# Patient Record
Sex: FEMALE | Race: WHITE | HISPANIC OR LATINO | Employment: FULL TIME | URBAN - METROPOLITAN AREA
[De-identification: names, ages, dates, MRNs, and addresses within clinical notes are randomized per-mention and may not be internally consistent; named-entity substitution may affect disease eponyms.]

---

## 2023-11-06 ENCOUNTER — OFFICE VISIT (OUTPATIENT)
Dept: FAMILY MEDICINE CLINIC | Facility: CLINIC | Age: 28
End: 2023-11-06
Payer: COMMERCIAL

## 2023-11-06 VITALS
TEMPERATURE: 98.1 F | DIASTOLIC BLOOD PRESSURE: 82 MMHG | HEART RATE: 78 BPM | HEIGHT: 66 IN | SYSTOLIC BLOOD PRESSURE: 110 MMHG | BODY MASS INDEX: 25.39 KG/M2 | RESPIRATION RATE: 16 BRPM | WEIGHT: 158 LBS

## 2023-11-06 DIAGNOSIS — Z13.29 SCREENING FOR THYROID DISORDER: ICD-10-CM

## 2023-11-06 DIAGNOSIS — Z86.2 HISTORY OF ANEMIA: ICD-10-CM

## 2023-11-06 DIAGNOSIS — J45.20 MILD INTERMITTENT ASTHMA WITHOUT COMPLICATION: ICD-10-CM

## 2023-11-06 DIAGNOSIS — Z11.59 NEED FOR HEPATITIS C SCREENING TEST: ICD-10-CM

## 2023-11-06 DIAGNOSIS — Z13.6 SCREENING FOR CARDIOVASCULAR, RESPIRATORY, AND GENITOURINARY DISEASES: ICD-10-CM

## 2023-11-06 DIAGNOSIS — Z11.4 SCREENING FOR HIV (HUMAN IMMUNODEFICIENCY VIRUS): ICD-10-CM

## 2023-11-06 DIAGNOSIS — N39.0 URINARY TRACT INFECTION WITHOUT HEMATURIA, SITE UNSPECIFIED: ICD-10-CM

## 2023-11-06 DIAGNOSIS — Z11.3 SCREENING FOR STD (SEXUALLY TRANSMITTED DISEASE): ICD-10-CM

## 2023-11-06 DIAGNOSIS — Z13.89 SCREENING FOR CARDIOVASCULAR, RESPIRATORY, AND GENITOURINARY DISEASES: ICD-10-CM

## 2023-11-06 DIAGNOSIS — Z13.83 SCREENING FOR CARDIOVASCULAR, RESPIRATORY, AND GENITOURINARY DISEASES: ICD-10-CM

## 2023-11-06 DIAGNOSIS — Z00.00 WELL ADULT EXAM: Primary | ICD-10-CM

## 2023-11-06 DIAGNOSIS — Z12.4 SCREENING FOR CERVICAL CANCER: ICD-10-CM

## 2023-11-06 PROCEDURE — 99385 PREV VISIT NEW AGE 18-39: CPT | Performed by: FAMILY MEDICINE

## 2023-11-06 RX ORDER — LEVALBUTEROL TARTRATE 45 UG/1
1-2 AEROSOL, METERED ORAL EVERY 4 HOURS PRN
Qty: 15 G | Refills: 0 | Status: SHIPPED | OUTPATIENT
Start: 2023-11-06

## 2023-11-06 NOTE — ASSESSMENT & PLAN NOTE
History of asthma since childhood, initially improved for a while, but returned after having COVID. Gets short of breath rarely, usually related to weather change. Has tried albuterol in the past, but has tachycardia with it so uses levalbuterol inhaler prn.

## 2023-11-06 NOTE — PROGRESS NOTES
[address]    NAME: Cody Riding  AGE: 33 y.o. SEX: female  : 1995     DATE: 2023     Assessment and Plan:     Problem List Items Addressed This Visit          Respiratory    Mild intermittent asthma     History of asthma since childhood, initially improved for a while, but returned after having COVID. Gets short of breath rarely, usually related to weather change. Has tried albuterol in the past, but has tachycardia with it so uses levalbuterol inhaler prn. Relevant Medications    levalbuterol (Xopenex HFA) 45 mcg/act inhaler     Other Visit Diagnoses       Well adult exam    -  Primary    Need for hepatitis C screening test        Relevant Orders    Hepatitis C Antibody    Screening for HIV (human immunodeficiency virus)        Relevant Orders    HIV 1/2 Antigen/Antibody (Fourth Generation) with Reflex Testing ([facility], [facility], or EXTERNAL LAB)    Screening for cardiovascular, respiratory, and genitourinary diseases        Relevant Orders    CBC and differential    Comprehensive metabolic panel    Lipid Panel with Direct LDL reflex    Screening for thyroid disorder        Relevant Orders    TSH, 3rd generation with Free T4 reflex    Screening for cervical cancer        Relevant Orders    Ambulatory referral to Obstetrics / Gynecology    Screening for STD (sexually transmitted disease)        Relevant Orders    RPR w/reflex to TrepSure    Chlamydia/GC amplified DNA by PCR    Herpes I/II IgG TAMICA w Reflex to HSV-2    Urinary tract infection without hematuria, site unspecified        Relevant Orders    Urine culture            Immunizations and preventive care screenings were discussed with patient today. Appropriate education was printed on patient's after visit summary. Depression Screening and Follow-up Plan: Patient was screened for depression during today's encounter.  They screened negative with a PHQ-2 score of 0.        No follow-ups on file. Chief Complaint:     Chief Complaint   Patient presents with    [facility]      History of Present Illness:     Adult Annual Physical   Patient here for a comprehensive physical exam. The patient reports no problems. Diet and Physical Activity  Diet/Nutrition: well balanced diet. Exercise: no formal exercise. Depression Screening  PHQ-2/9 Depression Screening    Little interest or pleasure in doing things: 0 - not at all  Feeling down, depressed, or hopeless: 0 - not at all  PHQ-2 Score: 0  PHQ-2 Interpretation: Negative depression screen       General Health  Sleep: sleeps well. Hearing: normal - bilateral.  Vision: no vision problems, goes for regular eye exams, wears glasses, and wears contacts. Dental: regular dental visits and brushes teeth twice daily. /GYN Health  Last menstrual period: 11/5/23  Contraceptive method: none, trying to conceive        Review of Systems:     Review of Systems   Constitutional: Negative. HENT: Negative. Eyes: Negative. Respiratory: Negative. Cardiovascular: Negative. Gastrointestinal: Negative. Endocrine: Negative. Genitourinary: Negative. Musculoskeletal: Negative. Skin: Negative. Allergic/Immunologic: Negative. Neurological: Negative. Hematological: Negative. Psychiatric/Behavioral: Negative. Past Medical History:     History reviewed. No pertinent past medical history.    Past Surgical History:     Past Surgical History:   Procedure Laterality Date    APPENDECTOMY      BUTTOCK LIFT      LIPOSUCTION        Social History:     Social History     Socioeconomic History    Marital status: /Civil Union     Spouse name: None    Number of children: None    Years of education: None    Highest education level: None   Occupational History    None   Tobacco Use    Smoking status: Never    Smokeless tobacco: Never   Vaping Use    Vaping Use: Never used   Substance and Sexual Activity    Alcohol use: None    Drug use: None    Sexual activity: None   Other Topics Concern    None   Social History Narrative    None     Social Determinants of Health     Financial Resource Strain: Not on file   Food Insecurity: Not on file   Transportation Needs: Not on file   Physical Activity: Not on file   Stress: Not on file   Social Connections: Not on file   Intimate Partner Violence: Not on file   Housing Stability: Not on file      Family History:     History reviewed. No pertinent family history. Current Medications:     Current Outpatient Medications   Medication Sig Dispense Refill    levalbuterol (Xopenex HFA) 45 mcg/act inhaler Inhale 1-2 puffs every 4 (four) hours as needed for wheezing 15 g 0    Prenatal FE-FA-DHA & Choline (ONE A DAY PRENATAL ADV BRAIN PO) Take by mouth       No current facility-administered medications for this visit. Allergies:     Not on File   Physical Exam:     /82   Pulse 78   Temp 98.1 °F (36.7 °C)   Resp 16   Ht 5' 5.5" (1.664 m)   Wt 71.7 kg (158 lb)   LMP 11/05/2023 (Exact Date)   BMI 25.89 kg/m²     Physical Exam  Vitals and nursing note reviewed. Constitutional:       General: She is not in acute distress. Appearance: She is well-developed. HENT:      Head: Normocephalic and atraumatic. Right Ear: Tympanic membrane, ear canal and external ear normal. There is no impacted cerumen. Left Ear: Tympanic membrane, ear canal and external ear normal. There is no impacted cerumen. Nose: Nose normal.      Mouth/Throat:      Mouth: Mucous membranes are moist.   Eyes:      Conjunctiva/sclera: Conjunctivae normal.   Cardiovascular:      Rate and Rhythm: Normal rate and regular rhythm. Heart sounds: No murmur heard. Pulmonary:      Effort: Pulmonary effort is normal. No respiratory distress. Breath sounds: Normal breath sounds. Abdominal:      General: Abdomen is flat. There is no distension.       Palpations: Abdomen is soft. There is no mass. Tenderness: There is no abdominal tenderness. There is no guarding or rebound. Hernia: No hernia is present. Musculoskeletal:         General: Normal range of motion. Cervical back: Neck supple. Skin:     General: Skin is warm and dry. Neurological:      General: No focal deficit present. Mental Status: She is alert and oriented to person, place, and time.           MD AMY [facility]

## 2023-11-08 LAB
ALBUMIN SERPL-MCNC: 4.6 G/DL (ref 4–5)
ALBUMIN/GLOB SERPL: 1.4 {RATIO} (ref 1.2–2.2)
ALP SERPL-CCNC: 91 IU/L (ref 44–121)
ALT SERPL-CCNC: 46 IU/L (ref 0–32)
AST SERPL-CCNC: 29 IU/L (ref 0–40)
BASOPHILS # BLD AUTO: 0 X10E3/UL (ref 0–0.2)
BASOPHILS NFR BLD AUTO: 1 %
BILIRUB SERPL-MCNC: 0.4 MG/DL (ref 0–1.2)
BUN SERPL-MCNC: 12 MG/DL (ref 6–20)
BUN/CREAT SERPL: 19 (ref 9–23)
CALCIUM SERPL-MCNC: 9.6 MG/DL (ref 8.7–10.2)
CHLORIDE SERPL-SCNC: 101 MMOL/L (ref 96–106)
CHOLEST SERPL-MCNC: 207 MG/DL (ref 100–199)
CO2 SERPL-SCNC: 23 MMOL/L (ref 20–29)
CREAT SERPL-MCNC: 0.62 MG/DL (ref 0.57–1)
EGFR: 125 ML/MIN/1.73
EOSINOPHIL # BLD AUTO: 0.1 X10E3/UL (ref 0–0.4)
EOSINOPHIL NFR BLD AUTO: 1 %
ERYTHROCYTE [DISTWIDTH] IN BLOOD BY AUTOMATED COUNT: 12.2 % (ref 11.7–15.4)
GLOBULIN SER-MCNC: 3.4 G/DL (ref 1.5–4.5)
GLUCOSE SERPL-MCNC: 96 MG/DL (ref 70–99)
HCT VFR BLD AUTO: 39 % (ref 34–46.6)
HCV AB S/CO SERPL IA: NON REACTIVE
HDLC SERPL-MCNC: 62 MG/DL
HGB BLD-MCNC: 13.3 G/DL (ref 11.1–15.9)
HIV 1+2 AB+HIV1 P24 AG SERPL QL IA: NON REACTIVE
IMM GRANULOCYTES # BLD: 0 X10E3/UL (ref 0–0.1)
IMM GRANULOCYTES NFR BLD: 0 %
LDLC SERPL CALC-MCNC: 134 MG/DL (ref 0–99)
LYMPHOCYTES # BLD AUTO: 1.9 X10E3/UL (ref 0.7–3.1)
LYMPHOCYTES NFR BLD AUTO: 32 %
MCH RBC QN AUTO: 30 PG (ref 26.6–33)
MCHC RBC AUTO-ENTMCNC: 34.1 G/DL (ref 31.5–35.7)
MCV RBC AUTO: 88 FL (ref 79–97)
MICRODELETION SYND BLD/T FISH: NORMAL
MONOCYTES # BLD AUTO: 0.4 X10E3/UL (ref 0.1–0.9)
MONOCYTES NFR BLD AUTO: 7 %
NEUTROPHILS # BLD AUTO: 3.4 X10E3/UL (ref 1.4–7)
NEUTROPHILS NFR BLD AUTO: 59 %
PLATELET # BLD AUTO: 284 X10E3/UL (ref 150–450)
POTASSIUM SERPL-SCNC: 4.3 MMOL/L (ref 3.5–5.2)
PROT SERPL-MCNC: 8 G/DL (ref 6–8.5)
RBC # BLD AUTO: 4.44 X10E6/UL (ref 3.77–5.28)
SODIUM SERPL-SCNC: 139 MMOL/L (ref 134–144)
TRIGL SERPL-MCNC: 64 MG/DL (ref 0–149)
TSH SERPL DL<=0.005 MIU/L-ACNC: 2.43 UIU/ML (ref 0.45–4.5)
WBC # BLD AUTO: 5.8 X10E3/UL (ref 3.4–10.8)

## 2023-11-10 ENCOUNTER — PATIENT MESSAGE (OUTPATIENT)
Dept: FAMILY MEDICINE CLINIC | Facility: CLINIC | Age: 28
End: 2023-11-10

## 2023-11-10 DIAGNOSIS — N39.0 ACUTE UTI (URINARY TRACT INFECTION): Primary | ICD-10-CM

## 2023-11-10 LAB
BACTERIA UR CULT: ABNORMAL
Lab: ABNORMAL
SL AMB ANTIMICROBIAL SUSCEPTIBILITY: ABNORMAL

## 2023-11-10 RX ORDER — CEPHALEXIN 500 MG/1
500 CAPSULE ORAL EVERY 12 HOURS SCHEDULED
Qty: 14 CAPSULE | Refills: 0 | Status: SHIPPED | OUTPATIENT
Start: 2023-11-10 | End: 2023-11-17

## 2023-11-10 RX ORDER — NITROFURANTOIN 25; 75 MG/1; MG/1
100 CAPSULE ORAL 2 TIMES DAILY
Qty: 10 CAPSULE | Refills: 0 | Status: SHIPPED | OUTPATIENT
Start: 2023-11-10 | End: 2023-11-10 | Stop reason: CLARIF

## 2023-11-21 LAB
BACTERIA UR CULT: NORMAL
Lab: NO GROWTH

## 2023-12-18 ENCOUNTER — NURSE TRIAGE (OUTPATIENT)
Age: 28
End: 2023-12-18

## 2023-12-18 NOTE — TELEPHONE ENCOUNTER
Regarding: Sharp stabbing abdominal pain  ----- Message from Joceline Smith sent at 12/18/2023 11:08 AM EST -----  Spoke to patient, patient had stated that she has been having some sharp stabbing abdominal pain since Saturday 12/16/2023.  A good call back number for the patient is [phone]. Please advise.

## 2023-12-18 NOTE — TELEPHONE ENCOUNTER
Patient calls in with constant right lower sided abdominal pain. States she had a positive urine pregnancy test on 12 13 2023.  LMP 11 6 2023 .  State ross is harp sometimes and than dull but always there.  Rates pain a 4 . Denies bleeding and states her appendix was removed years ago.    I recommended ER evaluation. Patient refused disposition and request I ask office if she can be seen in the office for this issue.  Call laced to office and spoke with Bozena. Recommendation r remains ER evaluation.

## 2023-12-18 NOTE — TELEPHONE ENCOUNTER
"Reason for Disposition  • Constant abdominal pain lasting > 2 hours  • MODERATE-SEVERE abdominal pain    Answer Assessment - Initial Assessment Questions  1. LOCATION: \"Where does it hurt?\"           Lower abdominal pain dull constant right lower side      12 13 2023 positive pregnancy test , LMP 11 5 2023     Denies bleeding    No appendix    2. RADIATION: \"Does the pain shoot anywhere else?\" (e.g., chest, back)        Denies    3. ONSET: \"When did the pain begin?\" (e.g., minutes, hours or days ago)          Saturday    4. SUDDEN: \"Gradual or sudden onset?\"        Sudden     5. PATTERN \"Does the pain come and go, or is it constant?\"     - If constant: \"Is it getting better, staying the same, or worsening?\"       (Note: Constant means the pain never goes away completely; most serious pain is constant and it progresses)      - If intermittent: \"How long does it last?\" \"Do you have pain now?\"      (Note: Intermittent means the pain goes away completely between bouts)         Constant       6. SEVERITY: \"How bad is the pain?\"  (e.g., Scale 1-10; mild, moderate, or severe)     - MILD (1-3): doesn't interfere with normal activities, abdomen soft and not tender to touch      - MODERATE (4-7): interferes with normal activities or awakens from sleep, tender to touch      - SEVERE (8-10): excruciating pain, doubled over, unable to do any normal activities            Rates a 4       7. RECURRENT SYMPTOM: \"Have you ever had this type of stomach pain before?\" If Yes, ask: \"When was the last time?\" and \"What happened that time?\"           Denies    8. AGGRAVATING FACTORS: \"Does anything seem to cause this pain?\" (e.g., foods, stress, alcohol)          Denies    9. CARDIAC SYMPTOMS: \"Do you have any of the following symptoms: chest pain, difficulty breathing, sweating, nausea?\"            10. OTHER SYMPTOMS: \"Do you have any other symptoms?\" (e.g., fever, vomiting, diarrhea)          Denies    Answer Assessment - Initial " "Assessment Questions  1. LOCATION: \"Where does it hurt?\"        Lower right side of abdomen constant pain pos preg test on 12 13 2023     No appendix    2. RADIATION: \"Does the pain shoot anywhere else?\" (e.g., chest, back, shoulder)        denies    3. ONSET: \"When did the pain begin?\" (e.g., minutes, hours or days ago)         12 16 2023    4. ONSET: \"Gradual or sudden onset?\"          Sudeen       5. PATTERN \"Does the pain come and go, or has it been constant since it started?\"         constant      6. SEVERITY: \"How bad is the pain?\" \"What does it keep you from doing?\"  (e.g., Scale 1-10; mild, moderate, or severe)    - MILD (1-3): doesn't interfere with normal activities, abdomen soft and not tender to touch     - MODERATE (4-7): interferes with normal activities or awakens from sleep, tender to touch     - SEVERE (8-10): excruciating pain, doubled over, unable to do any normal activities        moderate    7. RECURRENT SYMPTOM: \"Have you ever had this type of stomach pain before?\" If Yes, ask: \"When was the last time?\" and \"What happened that time?\"        No      8. CAUSE: \"What do you think is causing the stomach pain?\"        unsure    9. RELIEVING/AGGRAVATING FACTORS: \"What makes it better or worse?\" (e.g., antacids, bowel movement, movement)       Denies    Protocols used: Abdominal Pain - Upper-ADULT-[place], Pregnancy - Abdominal Pain Less Than 20 Weeks EGA-ADULT-[place]    "